# Patient Record
Sex: FEMALE | Race: WHITE | NOT HISPANIC OR LATINO | Employment: OTHER | URBAN - METROPOLITAN AREA
[De-identification: names, ages, dates, MRNs, and addresses within clinical notes are randomized per-mention and may not be internally consistent; named-entity substitution may affect disease eponyms.]

---

## 2017-03-13 ENCOUNTER — RECORDS - HEALTHEAST (OUTPATIENT)
Dept: LAB | Facility: CLINIC | Age: 72
End: 2017-03-13

## 2017-03-13 LAB
CHOLEST SERPL-MCNC: 223 MG/DL
FASTING STATUS PATIENT QL REPORTED: ABNORMAL
HDLC SERPL-MCNC: 39 MG/DL
LDLC SERPL CALC-MCNC: 147 MG/DL
TRIGL SERPL-MCNC: 183 MG/DL

## 2018-02-02 ENCOUNTER — DOCUMENTATION ONLY (OUTPATIENT)
Dept: FAMILY MEDICINE | Facility: OTHER | Age: 73
End: 2018-02-02

## 2018-02-02 RX ORDER — LOSARTAN POTASSIUM 50 MG/1
50 TABLET ORAL DAILY
COMMUNITY
Start: 2013-01-07

## 2018-02-02 RX ORDER — DULOXETIN HYDROCHLORIDE 30 MG/1
CAPSULE, DELAYED RELEASE ORAL
COMMUNITY
Start: 2009-10-07

## 2018-02-02 RX ORDER — SIMVASTATIN 20 MG
20 TABLET ORAL AT BEDTIME
COMMUNITY
Start: 2013-01-07

## 2018-02-02 RX ORDER — LEVOTHYROXINE SODIUM 25 UG/1
25 TABLET ORAL
COMMUNITY
Start: 2013-01-07

## 2018-02-02 RX ORDER — HYDROCHLOROTHIAZIDE 25 MG/1
25 TABLET ORAL DAILY
COMMUNITY
Start: 2013-01-07

## 2018-04-05 ENCOUNTER — RECORDS - HEALTHEAST (OUTPATIENT)
Dept: LAB | Facility: CLINIC | Age: 73
End: 2018-04-05

## 2018-04-05 LAB
ALBUMIN SERPL-MCNC: 3.5 G/DL (ref 3.5–5)
ALP SERPL-CCNC: 175 U/L (ref 45–120)
ALT SERPL W P-5'-P-CCNC: 22 U/L (ref 0–45)
ANION GAP SERPL CALCULATED.3IONS-SCNC: 10 MMOL/L (ref 5–18)
AST SERPL W P-5'-P-CCNC: 27 U/L (ref 0–40)
BILIRUB SERPL-MCNC: 0.5 MG/DL (ref 0–1)
BUN SERPL-MCNC: 14 MG/DL (ref 8–28)
CALCIUM SERPL-MCNC: 9 MG/DL (ref 8.5–10.5)
CHLORIDE BLD-SCNC: 103 MMOL/L (ref 98–107)
CHOLEST SERPL-MCNC: 220 MG/DL
CO2 SERPL-SCNC: 27 MMOL/L (ref 22–31)
CREAT SERPL-MCNC: 0.99 MG/DL (ref 0.6–1.1)
FASTING STATUS PATIENT QL REPORTED: ABNORMAL
GFR SERPL CREATININE-BSD FRML MDRD: 55 ML/MIN/1.73M2
GLUCOSE BLD-MCNC: 95 MG/DL (ref 70–125)
HDLC SERPL-MCNC: 31 MG/DL
LDLC SERPL CALC-MCNC: 134 MG/DL
POTASSIUM BLD-SCNC: 3.9 MMOL/L (ref 3.5–5)
PROT SERPL-MCNC: 7 G/DL (ref 6–8)
SODIUM SERPL-SCNC: 140 MMOL/L (ref 136–145)
TRIGL SERPL-MCNC: 273 MG/DL

## 2018-04-06 LAB — HCV AB SERPL QL IA: NEGATIVE

## 2018-07-06 ENCOUNTER — RECORDS - HEALTHEAST (OUTPATIENT)
Dept: ADMINISTRATIVE | Facility: OTHER | Age: 73
End: 2018-07-06

## 2018-07-06 ENCOUNTER — RECORDS - HEALTHEAST (OUTPATIENT)
Dept: LAB | Facility: CLINIC | Age: 73
End: 2018-07-06

## 2018-07-06 LAB
ALBUMIN SERPL-MCNC: 3.6 G/DL (ref 3.5–5)
ALP SERPL-CCNC: 124 U/L (ref 45–120)
ALT SERPL W P-5'-P-CCNC: <9 U/L (ref 0–45)
ANION GAP SERPL CALCULATED.3IONS-SCNC: 9 MMOL/L (ref 5–18)
AST SERPL W P-5'-P-CCNC: 14 U/L (ref 0–40)
BILIRUB SERPL-MCNC: 0.7 MG/DL (ref 0–1)
BUN SERPL-MCNC: 19 MG/DL (ref 8–28)
CALCIUM SERPL-MCNC: 9 MG/DL (ref 8.5–10.5)
CHLORIDE BLD-SCNC: 106 MMOL/L (ref 98–107)
CO2 SERPL-SCNC: 27 MMOL/L (ref 22–31)
CREAT SERPL-MCNC: 1.2 MG/DL (ref 0.6–1.1)
GFR SERPL CREATININE-BSD FRML MDRD: 44 ML/MIN/1.73M2
GLUCOSE BLD-MCNC: 97 MG/DL (ref 70–125)
POTASSIUM BLD-SCNC: 3.8 MMOL/L (ref 3.5–5)
PROT SERPL-MCNC: 7 G/DL (ref 6–8)
SODIUM SERPL-SCNC: 142 MMOL/L (ref 136–145)

## 2018-07-30 ENCOUNTER — HOSPITAL ENCOUNTER (OUTPATIENT)
Dept: NUCLEAR MEDICINE | Facility: HOSPITAL | Age: 73
Discharge: HOME | End: 2018-07-30
Attending: FAMILY MEDICINE

## 2018-07-30 ENCOUNTER — RECORDS - HEALTHEAST (OUTPATIENT)
Dept: ADMINISTRATIVE | Facility: OTHER | Age: 73
End: 2018-07-30

## 2018-07-30 ENCOUNTER — HOSPITAL ENCOUNTER (OUTPATIENT)
Dept: CARDIOLOGY | Facility: HOSPITAL | Age: 73
Discharge: HOME | End: 2018-07-30
Attending: FAMILY MEDICINE

## 2018-07-30 DIAGNOSIS — R07.9 CHEST PAIN, UNSPECIFIED TYPE: ICD-10-CM

## 2018-07-30 LAB
CV STRESS CURRENT BP HE: NORMAL
CV STRESS CURRENT HR HE: 78
CV STRESS CURRENT HR HE: 79
CV STRESS CURRENT HR HE: 80
CV STRESS CURRENT HR HE: 84
CV STRESS CURRENT HR HE: 84
CV STRESS CURRENT HR HE: 85
CV STRESS CURRENT HR HE: 86
CV STRESS CURRENT HR HE: 86
CV STRESS CURRENT HR HE: 87
CV STRESS CURRENT HR HE: 88
CV STRESS CURRENT HR HE: 89
CV STRESS DEVIATION TIME HE: NORMAL
CV STRESS ECHO PERCENT HR HE: NORMAL
CV STRESS EXERCISE STAGE HE: NORMAL
CV STRESS FINAL RESTING BP HE: NORMAL
CV STRESS FINAL RESTING HR HE: 84
CV STRESS MAX HR HE: 90
CV STRESS MAX TREADMILL GRADE HE: 0
CV STRESS MAX TREADMILL SPEED HE: 0
CV STRESS PEAK DIA BP HE: NORMAL
CV STRESS PEAK SYS BP HE: NORMAL
CV STRESS PHASE HE: NORMAL
CV STRESS PROTOCOL HE: NORMAL
CV STRESS RESTING PT POSITION HE: NORMAL
CV STRESS ST DEVIATION AMOUNT HE: NORMAL
CV STRESS ST DEVIATION ELEVATION HE: NORMAL
CV STRESS ST EVELATION AMOUNT HE: NORMAL
CV STRESS TEST TYPE HE: NORMAL
CV STRESS TOTAL STAGE TIME MIN 1 HE: NORMAL
NUC STRESS EJECTION FRACTION: 61 %
STRESS ECHO BASELINE BP: NORMAL
STRESS ECHO BASELINE HR: 78
STRESS ECHO CALCULATED PERCENT HR: 61 %
STRESS ECHO LAST STRESS BP: NORMAL
STRESS ECHO LAST STRESS HR: 87

## 2018-07-30 ASSESSMENT — MIFFLIN-ST. JEOR: SCORE: 1382.19

## 2018-08-08 ENCOUNTER — RECORDS - HEALTHEAST (OUTPATIENT)
Dept: LAB | Facility: CLINIC | Age: 73
End: 2018-08-08

## 2018-08-08 LAB
ALBUMIN SERPL-MCNC: 3.5 G/DL (ref 3.5–5)
ALP SERPL-CCNC: 114 U/L (ref 45–120)
ALT SERPL W P-5'-P-CCNC: 10 U/L (ref 0–45)
ANION GAP SERPL CALCULATED.3IONS-SCNC: 13 MMOL/L (ref 5–18)
AST SERPL W P-5'-P-CCNC: 13 U/L (ref 0–40)
BILIRUB SERPL-MCNC: 0.8 MG/DL (ref 0–1)
BUN SERPL-MCNC: 27 MG/DL (ref 8–28)
CALCIUM SERPL-MCNC: 9.3 MG/DL (ref 8.5–10.5)
CHLORIDE BLD-SCNC: 101 MMOL/L (ref 98–107)
CO2 SERPL-SCNC: 22 MMOL/L (ref 22–31)
CREAT SERPL-MCNC: 1.51 MG/DL (ref 0.6–1.1)
GFR SERPL CREATININE-BSD FRML MDRD: 34 ML/MIN/1.73M2
GLUCOSE BLD-MCNC: 118 MG/DL (ref 70–125)
POTASSIUM BLD-SCNC: 3.9 MMOL/L (ref 3.5–5)
PROT SERPL-MCNC: 7.2 G/DL (ref 6–8)
SODIUM SERPL-SCNC: 136 MMOL/L (ref 136–145)

## 2018-08-09 ENCOUNTER — RECORDS - HEALTHEAST (OUTPATIENT)
Dept: LAB | Facility: CLINIC | Age: 73
End: 2018-08-09

## 2018-08-09 LAB
C DIFF TOX B STL QL: NEGATIVE
RIBOTYPE 027/NAP1/BI: NORMAL

## 2018-08-10 LAB
O+P STL MICRO: NORMAL
SHIGA TOXIN 1: NEGATIVE
SHIGA TOXIN 2: NEGATIVE

## 2018-08-12 LAB — BACTERIA SPEC CULT: NORMAL

## 2018-08-31 ENCOUNTER — TRANSFERRED RECORDS (OUTPATIENT)
Dept: HEALTH INFORMATION MANAGEMENT | Facility: CLINIC | Age: 73
End: 2018-08-31

## 2018-08-31 ASSESSMENT — MIFFLIN-ST. JEOR
SCORE: 1341.36
SCORE: 1345.9

## 2018-09-02 ENCOUNTER — TRANSFERRED RECORDS (OUTPATIENT)
Dept: HEALTH INFORMATION MANAGEMENT | Facility: CLINIC | Age: 73
End: 2018-09-02

## 2018-09-03 ENCOUNTER — TRANSFERRED RECORDS (OUTPATIENT)
Dept: HEALTH INFORMATION MANAGEMENT | Facility: CLINIC | Age: 73
End: 2018-09-03

## 2018-09-03 ENCOUNTER — ANESTHESIA - HEALTHEAST (OUTPATIENT)
Dept: SURGERY | Facility: CLINIC | Age: 73
End: 2018-09-03

## 2018-09-03 ENCOUNTER — SURGERY - HEALTHEAST (OUTPATIENT)
Dept: SURGERY | Facility: CLINIC | Age: 73
End: 2018-09-03

## 2018-09-07 ENCOUNTER — COMMUNICATION - HEALTHEAST (OUTPATIENT)
Dept: PHARMACY | Facility: CLINIC | Age: 73
End: 2018-09-07

## 2018-09-10 ENCOUNTER — OFFICE VISIT - HEALTHEAST (OUTPATIENT)
Dept: GERIATRICS | Facility: CLINIC | Age: 73
End: 2018-09-10

## 2018-09-10 DIAGNOSIS — R19.7 DIARRHEA, UNSPECIFIED TYPE: ICD-10-CM

## 2018-09-10 DIAGNOSIS — R63.4 WEIGHT LOSS: ICD-10-CM

## 2018-09-10 DIAGNOSIS — K55.1 SMA STENOSIS: ICD-10-CM

## 2018-09-10 DIAGNOSIS — N39.0 URINARY TRACT INFECTION: ICD-10-CM

## 2018-09-10 DIAGNOSIS — N13.39 OTHER HYDRONEPHROSIS: ICD-10-CM

## 2018-09-10 DIAGNOSIS — I95.1 ORTHOSTATIC SYNCOPE: ICD-10-CM

## 2018-09-10 DIAGNOSIS — R29.6 FALLS FREQUENTLY: ICD-10-CM

## 2018-09-10 DIAGNOSIS — N85.8 UTERINE MASS: ICD-10-CM

## 2018-09-10 DIAGNOSIS — I77.4 CELIAC ARTERY STENOSIS (H): ICD-10-CM

## 2018-09-10 DIAGNOSIS — C53.9 CERVICAL CANCER (H): ICD-10-CM

## 2018-09-13 ENCOUNTER — OFFICE VISIT - HEALTHEAST (OUTPATIENT)
Dept: GERIATRICS | Facility: CLINIC | Age: 73
End: 2018-09-13

## 2018-09-13 DIAGNOSIS — R29.6 FALLS FREQUENTLY: ICD-10-CM

## 2018-09-13 DIAGNOSIS — C53.9 CERVICAL CANCER (H): ICD-10-CM

## 2018-09-13 DIAGNOSIS — I95.1 ORTHOSTATIC SYNCOPE: ICD-10-CM

## 2018-09-13 DIAGNOSIS — R53.81 PHYSICAL DECONDITIONING: ICD-10-CM

## 2018-09-17 ENCOUNTER — OFFICE VISIT - HEALTHEAST (OUTPATIENT)
Dept: GERIATRICS | Facility: CLINIC | Age: 73
End: 2018-09-17

## 2018-09-17 DIAGNOSIS — R19.7 DIARRHEA, UNSPECIFIED TYPE: ICD-10-CM

## 2018-09-17 DIAGNOSIS — I95.1 ORTHOSTATIC SYNCOPE: ICD-10-CM

## 2018-09-17 DIAGNOSIS — K55.1 SMA STENOSIS: ICD-10-CM

## 2018-09-17 DIAGNOSIS — C53.9 CERVICAL CANCER (H): ICD-10-CM

## 2018-09-17 DIAGNOSIS — R63.4 WEIGHT LOSS: ICD-10-CM

## 2018-09-17 DIAGNOSIS — I77.4 CELIAC ARTERY STENOSIS (H): ICD-10-CM

## 2018-09-17 DIAGNOSIS — N13.30 HYDRONEPHROSIS, UNSPECIFIED HYDRONEPHROSIS TYPE: ICD-10-CM

## 2018-09-17 DIAGNOSIS — N85.8 UTERINE MASS: ICD-10-CM

## 2018-09-18 ENCOUNTER — TRANSFERRED RECORDS (OUTPATIENT)
Dept: HEALTH INFORMATION MANAGEMENT | Facility: CLINIC | Age: 73
End: 2018-09-18

## 2018-09-21 ENCOUNTER — TRANSFERRED RECORDS (OUTPATIENT)
Dept: HEALTH INFORMATION MANAGEMENT | Facility: CLINIC | Age: 73
End: 2018-09-21

## 2018-09-24 ENCOUNTER — OFFICE VISIT - HEALTHEAST (OUTPATIENT)
Dept: GERIATRICS | Facility: CLINIC | Age: 73
End: 2018-09-24

## 2018-09-24 DIAGNOSIS — N13.30 HYDRONEPHROSIS, UNSPECIFIED HYDRONEPHROSIS TYPE: ICD-10-CM

## 2018-09-24 DIAGNOSIS — I95.1 ORTHOSTATIC SYNCOPE: ICD-10-CM

## 2018-09-24 DIAGNOSIS — N85.8 UTERINE MASS: ICD-10-CM

## 2018-09-24 DIAGNOSIS — I77.4 CELIAC ARTERY STENOSIS (H): ICD-10-CM

## 2018-09-24 DIAGNOSIS — R63.4 WEIGHT LOSS: ICD-10-CM

## 2018-09-24 DIAGNOSIS — C53.9 CERVICAL CANCER (H): ICD-10-CM

## 2018-09-24 DIAGNOSIS — K55.1 SMA STENOSIS: ICD-10-CM

## 2018-09-27 ENCOUNTER — OFFICE VISIT - HEALTHEAST (OUTPATIENT)
Dept: GERIATRICS | Facility: CLINIC | Age: 73
End: 2018-09-27

## 2018-09-27 DIAGNOSIS — R63.4 WEIGHT LOSS: ICD-10-CM

## 2018-09-27 DIAGNOSIS — K55.1 SMA STENOSIS: ICD-10-CM

## 2018-09-27 DIAGNOSIS — N85.8 UTERINE MASS: ICD-10-CM

## 2018-09-27 DIAGNOSIS — I77.4 CELIAC ARTERY STENOSIS (H): ICD-10-CM

## 2018-09-27 DIAGNOSIS — R41.89 COGNITIVE IMPAIRMENT: ICD-10-CM

## 2018-09-27 DIAGNOSIS — C53.9 CERVICAL CANCER (H): ICD-10-CM

## 2018-09-27 DIAGNOSIS — N13.39 OTHER HYDRONEPHROSIS: ICD-10-CM

## 2018-09-27 DIAGNOSIS — I95.1 ORTHOSTATIC SYNCOPE: ICD-10-CM

## 2018-09-30 ENCOUNTER — TRANSFERRED RECORDS (OUTPATIENT)
Dept: HEALTH INFORMATION MANAGEMENT | Facility: CLINIC | Age: 73
End: 2018-09-30

## 2018-10-01 ENCOUNTER — TRANSFERRED RECORDS (OUTPATIENT)
Dept: HEALTH INFORMATION MANAGEMENT | Facility: CLINIC | Age: 73
End: 2018-10-01

## 2018-10-01 ENCOUNTER — PRE VISIT (OUTPATIENT)
Dept: RADIATION ONCOLOGY | Facility: CLINIC | Age: 73
End: 2018-10-01

## 2018-10-01 NOTE — TELEPHONE ENCOUNTER
Date of appointment: TBD   Diagnosis/reason for appointment: Cervical Cancer  Referring provider/facility: Dr. Blount/[facility]  Who called: Emeli [phone]    Recent Studies  Imaging:  Pathology:  Labs:  Previous chemo/radiation (if known): Patient receiving External Beam starting 10/4/18    Records requested from: [facility] and [facility] Records received from:    Additional information: Being referred for Brachy

## 2018-10-02 ENCOUNTER — AMBULATORY - HEALTHEAST (OUTPATIENT)
Dept: GERIATRICS | Facility: CLINIC | Age: 73
End: 2018-10-02

## 2018-10-08 ENCOUNTER — TELEPHONE (OUTPATIENT)
Dept: RADIATION ONCOLOGY | Facility: CLINIC | Age: 73
End: 2018-10-08

## 2018-10-08 NOTE — TELEPHONE ENCOUNTER
Called Emeli RN with Dr. Blount, [facility] re: Nionska.  She is scheduled to start radiation therapy 108/18 and planned for 25 fractions/5000cGy.  Ninoska is being referred for possible interstitial therapy.  Per Emeli patient was hospitalized for bleeding, now discharged. She has dementia and is living with her daughter Dariela who is the person to contact for appointments.  Ninoska had a PET and MRI 9/21 at [facility]- note sent to  to have these pushed to our system.  Emeli's number [phone].

## 2018-10-09 PROCEDURE — 00000346 ZZHCL STATISTIC REVIEW OUTSIDE SLIDES TC 88321: Performed by: RADIOLOGY

## 2018-10-09 NOTE — TELEPHONE ENCOUNTER
Per pool message, scheduled her for a consult with Dr. Real on 10/17. Called [facility] to verify previous RT records will be sent.

## 2018-10-10 LAB — COPATH REPORT: NORMAL

## 2018-10-17 ENCOUNTER — OFFICE VISIT (OUTPATIENT)
Dept: RADIATION ONCOLOGY | Facility: CLINIC | Age: 73
End: 2018-10-17
Attending: RADIOLOGY
Payer: MEDICARE

## 2018-10-17 VITALS
BODY MASS INDEX: 23.07 KG/M2 | HEIGHT: 67 IN | SYSTOLIC BLOOD PRESSURE: 147 MMHG | WEIGHT: 147 LBS | DIASTOLIC BLOOD PRESSURE: 70 MMHG

## 2018-10-17 DIAGNOSIS — C53.1 MALIGNANT NEOPLASM OF EXOCERVIX (H): Primary | ICD-10-CM

## 2018-10-17 PROCEDURE — G0463 HOSPITAL OUTPT CLINIC VISIT: HCPCS | Performed by: RADIOLOGY

## 2018-10-17 PROCEDURE — A4648 IMPLANTABLE TISSUE MARKER: HCPCS | Performed by: RADIOLOGY

## 2018-10-17 RX ORDER — OXYCODONE AND ACETAMINOPHEN 5; 325 MG/1; MG/1
TABLET ORAL
COMMUNITY
Start: 2018-10-05

## 2018-10-17 RX ORDER — ESCITALOPRAM OXALATE 20 MG/1
TABLET ORAL
COMMUNITY
Start: 2018-04-17

## 2018-10-17 RX ORDER — LEVOFLOXACIN 250 MG/1
TABLET, FILM COATED ORAL
COMMUNITY
Start: 2018-10-05

## 2018-10-17 RX ORDER — ONDANSETRON 8 MG/1
TABLET, FILM COATED ORAL
COMMUNITY
Start: 2018-10-15

## 2018-10-17 RX ORDER — LISINOPRIL 5 MG/1
TABLET ORAL
COMMUNITY
Start: 2018-04-17

## 2018-10-17 ASSESSMENT — ENCOUNTER SYMPTOMS
CONSTIPATION: 0
FEVER: 0
DIZZINESS: 0
WEIGHT LOSS: 0
SPEECH CHANGE: 0
CLAUDICATION: 0
PHOTOPHOBIA: 0
BLOOD IN STOOL: 0
SORE THROAT: 0
ORTHOPNEA: 0
HEMOPTYSIS: 0
TINGLING: 0
INSOMNIA: 0
PALPITATIONS: 0
WEAKNESS: 0
STRIDOR: 0
BRUISES/BLEEDS EASILY: 0
DYSURIA: 0
DEPRESSION: 0
DIAPHORESIS: 0
HEADACHES: 0
MEMORY LOSS: 0
NAUSEA: 0
DIARRHEA: 0
EYE DISCHARGE: 0
DOUBLE VISION: 0
VOMITING: 0
EYE PAIN: 0
SENSORY CHANGE: 0
TREMORS: 0
FALLS: 0
POLYDIPSIA: 0
FLANK PAIN: 0
SPUTUM PRODUCTION: 0
SHORTNESS OF BREATH: 0
EYE REDNESS: 0
COUGH: 0
SINUS PAIN: 0
HALLUCINATIONS: 0
FOCAL WEAKNESS: 0
BLURRED VISION: 0
BACK PAIN: 1
SEIZURES: 0
CHILLS: 0
NERVOUS/ANXIOUS: 1
MYALGIAS: 0
PND: 0
HEARTBURN: 0
NECK PAIN: 0
LOSS OF CONSCIOUSNESS: 0
HEMATURIA: 0
ABDOMINAL PAIN: 1
WHEEZING: 0
FREQUENCY: 0

## 2018-10-17 ASSESSMENT — LIFESTYLE VARIABLES: SUBSTANCE_ABUSE: 0

## 2018-10-17 NOTE — PROGRESS NOTES
RADIATION ONCOLOGY CONSULTATION    DATE:  October 17, 2018  PATIENT NAME: Ninoska Still  MEDICAL RECORD NUMBER: [id number]    Ninoska Still is seen in consultation at the request of Dr. 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    HISTORY OF PRESENT ILLNESS:   Ms. Ninoska Still is a 73 year-old woman who was hospitalized in early September 2018 for urosepsis and found to have an enlarged cervical mass on CT scan of the abdomen and pelvis. 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 Pelvic ultrasound demonstrated a mass arising from the cervix, measuring 7.8 cm in greatest dimension, with involvement of the lower uterine segment.  She underwent left ureteral stenting. At time of ureteral stenting, the bladder trigone was distorted and abnormal tissue was visualized below the mucosa at the level of the left ureteral orifice.  Biopsy demonstrated moderately differentiated squamous cell carcinoma.  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  CT angiogram showed collateral arterial flow to the mesentery via the inferior mesenteric artery without concern for bowel ischemia.  She was seen by Dr. Lane Blount on 9/18/18 at [facility].  On exam, a large, friable, minimally mobile mass at the left vaginal apex was noted with replacement of the cervix os. Further staging evaluation with a PET CT scan and pelvic MRI were recommended prior to starting definitive chemoradiation.  PET/CT scan on 9/21/2018 showed a 10.6 cm lobulated mass in the lower uterus, cervix and upper vagina (SUV max 10.9), a 4.7 cm right thyroid lobe nodule (SUV max 8.3) and 3 FDG avid left parotid nodules, largest measuring 0.9 cm.  MRI of the pelvis on  the same day showed an 11 cm cervical mass with invasion of the parametria.  There was also posterior bladder wall invasion with involvement of the left ureterovesical junction and upper vaginal invasion.  On MRI scan, there were mildly prominent right external and internal iliac nodes although no adenopathy was seen on PET scan.  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  She has started chemoradiation with weekly cisplatin chemotherapy.  She presents for consideration of high-dose rate brachytherapy to the cervix and vagina.  Ms. Still has dementia and was recently unable to care for herself.  She has moved in with her daughter for the duration of her treatments.  She is currently tolerating chemoradiation without significant side effects.  She denies any further vaginal bleeding, urinary frequency, dysuria, hematuria.  PAST MEDICAL HISTORY:   Diagnosis Date     Cervical cancer (H)      Hyperlipidemia      Hypertension      PAST SURGICAL HISTORY:  Procedure Laterality Date     CHOLECYSTECTOMY       PAST RADIATION THERAPY HISTORY: Currently receiving pelvic external beam radiation at [facility]      PAST CHEMOTHERAPY HISTORY: Currently receiving weekly low-dose cisplatin at [facility]      ELECTRONIC CARDIAC DEVICE: None      PREGNANCY STATUS: Postmenopausal    MEDICATIONS:  Medication Sig     Duloxetine (CYMBALTA) 30 MG EC capsule      escitalopram (LEXAPRO) 20 MG tablet      hydrochlorothiazide (HYDRODIURIL) 25 MG tablet Take 25 mg by mouth daily     levofloxacin (LEVAQUIN) 250 MG tablet      levothyroxine (SYNTHROID/LEVOTHROID) 25 MCG tablet Take 25 mcg by mouth every morning (before breakfast)     lisinopril (PRINIVIL/ZESTRIL) 5 MG tablet      losartan (COZAAR) 50 MG tablet Take 50 mg by mouth daily     ondansetron (ZOFRAN) 8 MG  "tablet      oxyCODONE-acetaminophen (PERCOCET) 5-325 MG per tablet      simvastatin (ZOCOR) 20 MG tablet Take 20 mg by mouth At Bedtime     ALLERGY:  Allergen Reactions     Codeine Visual Disturbance     Penicillins      FAMILY CANCER HISTORY: Noncontributory    SOCIAL HISTORY:  Social History   Substance Use Topics     Smoking status: Former Smoker     Quit date: 10/17/2017     Smokeless tobacco: Never Used     Alcohol use No     REVIEW OF SYSTEMS: A 14 point review of systems was performed and reviewed in the medical record.  Pertinent positives and negatives are noted in the history of present illness.    KARNOFSKY PERFORMANCE STATUS: 50    PHYSICAL EXAMINATION:  /70  Ht 1.702 m (5' 7\")  Wt 66.7 kg (147 lb)  BMI 23.02 kg/m2, pain 0/10  GENERAL: Alert, oriented, not in acute distress.   HEENT: Normocephalic, atraumatic  LUNGS: No wheezing, clear to auscultation bilaterally  CARDIOVASCULAR: Regular rate and rhythm  ABDOMEN: Soft, nondistended, nontender  GENITOURINARY: External genitalia without lesions.  Speculum exam demonstrates no blood in the vaginal vault.  There is a large, friable mass in the superior vagina without visualization of the cervical os.  Tumor extends down the anterior vaginal wall.  On bimanual exam, there is palpable tumor to within 2 cm of the introitus anteriorly extending from 10:00 to 3:00.  The rectovaginal septum is clear of tumor.  EXTREMITIES: No edema  LYMPH: No palpable supraclavicular, axillary, periumbilical or inguinal adenopathy    2 gold fiducial markers were placed at the time of pelvic exam in the anterior vaginal wall to represent the most inferior extent of tumor.    IMAGING: See history of present illness    ASSESSMENT AND PLAN: Ms. Ninoska Still is 73 year old female with locally advanced squamous cell carcinoma cervical cancer currently undergoing concurrent chemoradiation at [facility]. She presents today with her daughter for discussion of high-dose rate " brachytherapy as part of her treatment.  We explained the rationale for recommending high-dose rate brachytherapy following the completion of pelvic chemoradiation.  Brachytherapy will allow her primary tumor to receive enough radiation dose to achieve local tumor control.  Because her cancer extends to the mid vagina anteriorly, brachytherapy is best delivered with interstitial treatment to adequately dose the vaginal and bladder tissues in addition to the primary cervical mass.  Interstitial brachytherapy is typically delivered as an inpatient over 3 days.  While in the hospital, the patient is supine and has an epidural for pain control.  We attempted to deliver the entire scope of her therapy within 56 days so that her brachytherapy will be scheduled to occur at the conclusion of her chemoradiation.  We will see her back after her last fashion of external beam radiation and obtain a repeat MRI scan of the pelvis to define the scope of disease and her treatment response.  She will also see preanesthesia for evaluation of suitability for an epidural catheter.  Typically placement of the interstitial template is scheduled within 1 to 1-1/2 weeks of completion of chemoradiation.  We also reviewed the anticipated acute side effects, potential risks and expected outcome of treatment.  Because her tumor involves the ureterovesical junction, she and her daughter were counseled on the risk of a vesicovaginal fistula formation.     Ms. Still was seen and discussed with staff, Dr. Real. Thank you for involving us in the care of this patient.  Please feel free to contact us with questions or concerns at any time.    Jacob Jarquin MD  PGY-2 Resident, Radiation Oncology  [facility]    Ms. Still was seen and examined by me. Note above by Dr. Jarquin was reviewed and edited by me and reflects our mutual findings and plan of care.  I spent a total of 60 minutes face-to-face with  and  her daughter during todays office visit. Over 75% of this time was spent counseling the patient and/or coordinating care regarding the diagnosis and the planned treatment.    Enedina Real MD  Department of Radiation Oncology  [facility]    Lane Astorga MD, Amy MD

## 2018-10-17 NOTE — PROGRESS NOTES
HPI  INITIAL PATIENT ASSESSMENT    Diagnosis: Cervical Cancer    Prior radiation therapy: Yes, currently    Prior chemotherapy: Yes, currently    Prior hormonal therapy:No    Pain Eval:  Current history of pain associated with this visit:   Intensity: 10/10  Current: aching  Location: Abdomen  Treatment: Percocet and Tylenol, patient just took medication in clinic    Psychosocial  Living arrangements: Lives with daughter  Fall Risk: independent   referral needs: Not needed    Advanced Directive: No  Implantable Cardiac Device? No    Onset of menarche: 13  LMP: No LMP recorded.  Onset of menopause: 50  Abnormal vaginal bleeding/discharge: No  Nurse face-to-face time: Level 5:  over 15 min face to face time  Review of Systems   Constitutional: Positive for malaise/fatigue. Negative for chills, diaphoresis, fever and weight loss.   HENT: Negative for congestion, ear discharge, ear pain, hearing loss, nosebleeds, sinus pain, sore throat and tinnitus.    Eyes: Negative for blurred vision, double vision, photophobia, pain, discharge and redness.   Respiratory: Negative for cough, hemoptysis, sputum production, shortness of breath, wheezing and stridor.    Cardiovascular: Negative for chest pain, palpitations, orthopnea, claudication, leg swelling and PND.   Gastrointestinal: Positive for abdominal pain. Negative for blood in stool, constipation, diarrhea, heartburn, melena, nausea and vomiting.   Genitourinary: Negative for dysuria, flank pain, frequency, hematuria and urgency.   Musculoskeletal: Positive for back pain. Negative for falls, joint pain, myalgias and neck pain.   Skin: Negative for itching and rash.   Neurological: Negative for dizziness, tingling, tremors, sensory change, speech change, focal weakness, seizures, loss of consciousness, weakness and headaches.   Endo/Heme/Allergies: Negative for environmental allergies and polydipsia. Does not bruise/bleed easily.   Psychiatric/Behavioral:  Negative for depression, hallucinations, memory loss, substance abuse and suicidal ideas. The patient is nervous/anxious. The patient does not have insomnia.

## 2018-10-17 NOTE — MR AVS SNAPSHOT
"              After Visit Summary   10/17/2018    Ninoska Still    MRN: [id number]           Patient Information     Date Of Birth          1945        Visit Information        Provider Department      10/17/2018 9:00 AM Enedina Real MD Radiation Oncology Clinic        Today's Diagnoses     Malignant neoplasm of exocervix (H)    -  1       Follow-ups after your visit        Who to contact     Please call your clinic at [phone] to:    Ask questions about your health    Make or cancel appointments    Discuss your medicines    Learn about your test results    Speak to your doctor            Additional Information About Your Visit        MyChart Information     Smart Cube is an electronic gateway that provides easy, online access to your medical records. With Smart Cube, you can request a clinic appointment, read your test results, renew a prescription or communicate with your care team.     To sign up for Smart Cube visit the website at [email or web address]   You will be asked to enter the access code listed below, as well as some personal information. Please follow the directions to create your username and password.     Your access code is: [email or web address]  Expires: 2019  7:57 PM     Your access code will  in 90 days. If you need help or a new code, please contact your [facility] or call [phone] for assistance.        Care EveryWhere ID     This is your Care EveryWhere ID. This could be used by other organizations to access your [place] medical records  [id number]        Your Vitals Were     Height BMI (Body Mass Index)                1.702 m (5' 7\") 23.02 kg/m2           Blood Pressure from Last 3 Encounters:   10/17/18 147/70    Weight from Last 3 Encounters:   10/17/18 66.7 kg (147 lb)              Today, you had the following     No orders found for display      Information about OPIOIDS     PRESCRIPTION OPIOIDS: WHAT YOU NEED TO KNOW   We gave you " an opioid (narcotic) pain medicine. It is important to manage your pain, but opioids are not always the best choice. You should first try all the other options your care team gave you. Take this medicine for as short a time (and as few doses) as possible.    Some activities can increase your pain, such as bandage changes or therapy sessions. It may help to take your pain medicine 30 to 60 minutes before these activities. Reduce your stress by getting enough sleep, working on hobbies you enjoy and practicing relaxation or meditation. Talk to your care team about ways to manage your pain beyond prescription opioids.    These medicines have risks:    DO NOT drive when on new or higher doses of pain medicine. These medicines can affect your alertness and reaction times, and you could be arrested for driving under the influence (DUI). If you need to use opioids long-term, talk to your care team about driving.    DO NOT operate heavy machinery    DO NOT do any other dangerous activities while taking these medicines.    DO NOT drink any alcohol while taking these medicines.     If the opioid prescribed includes acetaminophen, DO NOT take with any other medicines that contain acetaminophen. Read all labels carefully. Look for the word  acetaminophen  or  Tylenol.  Ask your pharmacist if you have questions or are unsure.    You can get addicted to pain medicines, especially if you have a history of addiction (chemical, alcohol or substance dependence). Talk to your care team about ways to reduce this risk.    All opioids tend to cause constipation. Drink plenty of water and eat foods that have a lot of fiber, such as fruits, vegetables, prune juice, apple juice and high-fiber cereal. Take a laxative (Miralax, milk of magnesia, Colace, Senna) if you don t move your bowels at least every other day. Other side effects include upset stomach, sleepiness, dizziness, throwing up, tolerance (needing more of the medicine to have the  same effect), physical dependence and slowed breathing.    Store your pills in a secure place, locked if possible. We will not replace any lost or stolen medicine. If you don t finish your medicine, please throw away (dispose) as directed by your pharmacist. The [place] [employer] has more information about safe disposal: https://www.pca.[facility].[place].us/living-green/managing-unwanted-medications         Primary Care Provider    None Specified       No primary provider on file.        Equal Access to Services     TREASURE MELGAR : Hadii minna ku hadasho Soomaali, waaxda luqadaha, qaybta kaalmada adeegyada, loretta waldron . So [facility] [phone].    ATENCIÓN: Si juanita rincon, tiene a connolly disposición servicios gratuitos de asistencia lingüística. Llame al [phone].    We comply with applicable federal civil rights laws and [place] laws. We do not discriminate on the basis of race, color, national origin, age, disability, sex, sexual orientation, or gender identity.            Thank you!     Thank you for choosing [facility]  for your care. Our goal is always to provide you with excellent care. Hearing back from our patients is one way we can continue to improve our services. Please take a few minutes to complete the written survey that you may receive in the mail after your visit with us. Thank you!             Your Updated Medication List - Protect others around you: Learn how to safely use, store and throw away your medicines at www.disposemymeds.org.          This list is accurate as of 10/17/18 11:59 PM.  Always use your most recent med list.                   Brand Name Dispense Instructions for use Diagnosis    CYMBALTA 30 MG EC capsule   Generic drug:  DULoxetine           escitalopram 20 MG tablet    LEXAPRO          hydrochlorothiazide 25 MG tablet    HYDRODIURIL     Take 25 mg by mouth daily        levofloxacin 250 MG tablet    LEVAQUIN           levothyroxine 25 MCG tablet    SYNTHROID/LEVOTHROID     Take 25 mcg by mouth every morning (before breakfast)        lisinopril 5 MG tablet    PRINIVIL/ZESTRIL          losartan 50 MG tablet    COZAAR     Take 50 mg by mouth daily        ondansetron 8 MG tablet    ZOFRAN          oxyCODONE-acetaminophen 5-325 MG per tablet    PERCOCET          simvastatin 20 MG tablet    ZOCOR     Take 20 mg by mouth At Bedtime

## 2018-10-17 NOTE — LETTER
Date:October 22, 2018      Patient was self referred, no letter generated. Do not send.        [facility] Information

## 2018-10-17 NOTE — LETTER
10/17/2018       RE: Ninoska Still  [address] [facility] [place] [address]     Dear Colleague,    Thank you for referring your patient, Ninoska Still, to the RADIATION ONCOLOGY CLINIC. Please see a copy of my visit note below.      HPI  INITIAL PATIENT ASSESSMENT    Diagnosis: Cervical Cancer    Prior radiation therapy: Yes, currently    Prior chemotherapy: Yes, currently    Prior hormonal therapy:No    Pain Eval:  Current history of pain associated with this visit:   Intensity: 10/10  Current: aching  Location: Abdomen  Treatment: Percocet and Tylenol, patient just took medication in clinic    Psychosocial  Living arrangements: Lives with daughter  Fall Risk: independent   referral needs: Not needed    Advanced Directive: No  Implantable Cardiac Device? No    Onset of menarche: 13  LMP: No LMP recorded.  Onset of menopause: 50  Abnormal vaginal bleeding/discharge: No  Nurse face-to-face time: Level 5:  over 15 min face to face time  Review of Systems   Constitutional: Positive for malaise/fatigue. Negative for chills, diaphoresis, fever and weight loss.   HENT: Negative for congestion, ear discharge, ear pain, hearing loss, nosebleeds, sinus pain, sore throat and tinnitus.    Eyes: Negative for blurred vision, double vision, photophobia, pain, discharge and redness.   Respiratory: Negative for cough, hemoptysis, sputum production, shortness of breath, wheezing and stridor.    Cardiovascular: Negative for chest pain, palpitations, orthopnea, claudication, leg swelling and PND.   Gastrointestinal: Positive for abdominal pain. Negative for blood in stool, constipation, diarrhea, heartburn, melena, nausea and vomiting.   Genitourinary: Negative for dysuria, flank pain, frequency, hematuria and urgency.   Musculoskeletal: Positive for back pain. Negative for falls, joint pain, myalgias and neck pain.   Skin: Negative for itching and rash.   Neurological: Negative for dizziness, tingling,  tremors, sensory change, speech change, focal weakness, seizures, loss of consciousness, weakness and headaches.   Endo/Heme/Allergies: Negative for environmental allergies and polydipsia. Does not bruise/bleed easily.   Psychiatric/Behavioral: Negative for depression, hallucinations, memory loss, substance abuse and suicidal ideas. The patient is nervous/anxious. The patient does not have insomnia.                  RADIATION ONCOLOGY CONSULTATION    DATE:  October 17, 2018  PATIENT NAME: Ninoska Still  MEDICAL RECORD NUMBER: [id number]    Ninoska Still is seen in consultation at the request of Dr. 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    HISTORY OF PRESENT ILLNESS:   Ms. Ninoska Still is a 73 year-old woman who was hospitalized in early September 2018 for urosepsis and found to have an enlarged cervical mass on CT scan of the abdomen and pelvis. 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 Pelvic ultrasound demonstrated a mass arising from the cervix, measuring 7.8 cm in greatest dimension, with involvement of the lower uterine segment.  She underwent left ureteral stenting. At time of ureteral stenting, the bladder trigone was distorted and abnormal tissue was visualized below the mucosa at the level of the left ureteral orifice.  Biopsy demonstrated moderately differentiated squamous cell carcinoma.  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  CT angiogram showed collateral arterial flow to the mesentery via the inferior mesenteric artery without concern for bowel ischemia.  She was seen by Dr. Lane Blount on 9/18/18 at [facility].  On exam, a large, friable, minimally mobile mass at the left vaginal apex  was noted with replacement of the cervix os. Further staging evaluation with a PET CT scan and pelvic MRI were recommended prior to starting definitive chemoradiation.  PET/CT scan on 9/21/2018 showed a 10.6 cm lobulated mass in the lower uterus, cervix and upper vagina (SUV max 10.9), a 4.7 cm right thyroid lobe nodule (SUV max 8.3) and 3 FDG avid left parotid nodules, largest measuring 0.9 cm.  MRI of the pelvis on the same day showed an 11 cm cervical mass with invasion of the parametria.  There was also posterior bladder wall invasion with involvement of the left ureterovesical junction and upper vaginal invasion.  On MRI scan, there were mildly prominent right external and internal iliac nodes although no adenopathy was seen on PET scan.  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  She has started chemoradiation with weekly cisplatin chemotherapy.  She presents for consideration of high-dose rate brachytherapy to the cervix and vagina.  Ms. Still has dementia and was recently unable to care for herself.  She has moved in with her daughter for the duration of her treatments.  She is currently tolerating chemoradiation without significant side effects.  She denies any further vaginal bleeding, urinary frequency, dysuria, hematuria.  PAST MEDICAL HISTORY:   Diagnosis Date     Cervical cancer (H)      Hyperlipidemia      Hypertension      PAST SURGICAL HISTORY:  Procedure Laterality Date     CHOLECYSTECTOMY       PAST RADIATION THERAPY HISTORY: Currently receiving pelvic external beam radiation at [facility]      PAST CHEMOTHERAPY HISTORY: Currently receiving weekly low-dose cisplatin at [facility]      ELECTRONIC CARDIAC DEVICE: None      PREGNANCY STATUS: Postmenopausal    MEDICATIONS:  Medication Sig     Duloxetine (CYMBALTA) 30 MG  "EC capsule      escitalopram (LEXAPRO) 20 MG tablet      hydrochlorothiazide (HYDRODIURIL) 25 MG tablet Take 25 mg by mouth daily     levofloxacin (LEVAQUIN) 250 MG tablet      levothyroxine (SYNTHROID/LEVOTHROID) 25 MCG tablet Take 25 mcg by mouth every morning (before breakfast)     lisinopril (PRINIVIL/ZESTRIL) 5 MG tablet      losartan (COZAAR) 50 MG tablet Take 50 mg by mouth daily     ondansetron (ZOFRAN) 8 MG tablet      oxyCODONE-acetaminophen (PERCOCET) 5-325 MG per tablet      simvastatin (ZOCOR) 20 MG tablet Take 20 mg by mouth At Bedtime     ALLERGY:  Allergen Reactions     Codeine Visual Disturbance     Penicillins      FAMILY CANCER HISTORY: Noncontributory    SOCIAL HISTORY:  Social History   Substance Use Topics     Smoking status: Former Smoker     Quit date: 10/17/2017     Smokeless tobacco: Never Used     Alcohol use No     REVIEW OF SYSTEMS: A 14 point review of systems was performed and reviewed in the medical record.  Pertinent positives and negatives are noted in the history of present illness.    KARNOFSKY PERFORMANCE STATUS: 50    PHYSICAL EXAMINATION:  /70  Ht 1.702 m (5' 7\")  Wt 66.7 kg (147 lb)  BMI 23.02 kg/m2, pain 0/10  GENERAL: Alert, oriented, not in acute distress.   HEENT: Normocephalic, atraumatic  LUNGS: No wheezing, clear to auscultation bilaterally  CARDIOVASCULAR: Regular rate and rhythm  ABDOMEN: Soft, nondistended, nontender  GENITOURINARY: External genitalia without lesions.  Speculum exam demonstrates no blood in the vaginal vault.  There is a large, friable mass in the superior vagina without visualization of the cervical os.  Tumor extends down the anterior vaginal wall.  On bimanual exam, there is palpable tumor to within 2 cm of the introitus anteriorly extending from 10:00 to 3:00.  The rectovaginal septum is clear of tumor.  EXTREMITIES: No edema  LYMPH: No palpable supraclavicular, axillary, periumbilical or inguinal adenopathy    2 gold fiducial markers " were placed at the time of pelvic exam in the anterior vaginal wall to represent the most inferior extent of tumor.    IMAGING: See history of present illness    ASSESSMENT AND PLAN: Ms. Ninoska Still is 73 year old female with locally advanced squamous cell carcinoma cervical cancer currently undergoing concurrent chemoradiation at [facility]. She presents today with her daughter for discussion of high-dose rate brachytherapy as part of her treatment.  We explained the rationale for recommending high-dose rate brachytherapy following the completion of pelvic chemoradiation.  Brachytherapy will allow her primary tumor to receive enough radiation dose to achieve local tumor control.  Because her cancer extends to the mid vagina anteriorly, brachytherapy is best delivered with interstitial treatment to adequately dose the vaginal and bladder tissues in addition to the primary cervical mass.  Interstitial brachytherapy is typically delivered as an inpatient over 3 days.  While in the hospital, the patient is supine and has an epidural for pain control.  We attempted to deliver the entire scope of her therapy within 56 days so that her brachytherapy will be scheduled to occur at the conclusion of her chemoradiation.  We will see her back after her last fashion of external beam radiation and obtain a repeat MRI scan of the pelvis to define the scope of disease and her treatment response.  She will also see preanesthesia for evaluation of suitability for an epidural catheter.  Typically placement of the interstitial template is scheduled within 1 to 1-1/2 weeks of completion of chemoradiation.  We also reviewed the anticipated acute side effects, potential risks and expected outcome of treatment.  Because her tumor involves the ureterovesical junction, she and her daughter were counseled on the risk of a vesicovaginal fistula formation.     Ms. Still was seen and discussed with staff, Dr. Real. Thank you for  involving us in the care of this patient.  Please feel free to contact us with questions or concerns at any time.    Jacob Jarquin MD  PGY-2 Resident, Radiation Oncology  [facility]    Ms. Still was seen and examined by me. Note above by Dr. Jarquin was reviewed and edited by me and reflects our mutual findings and plan of care.  I spent a total of 60 minutes face-to-face with  and her daughter during todays office visit. Over 75% of this time was spent counseling the patient and/or coordinating care regarding the diagnosis and the planned treatment.    Enedina Real MD  Department of Radiation Oncology  [facility]    Lane Astorga MD, Amy MD           Again, thank you for allowing me to participate in the care of your patient.      Sincerely,    Enedina Real MD

## 2018-10-21 DIAGNOSIS — C53.1 MALIGNANT NEOPLASM OF EXOCERVIX (H): Primary | ICD-10-CM

## 2018-10-21 RX ORDER — CLINDAMYCIN PHOSPHATE 900 MG/50ML
900 INJECTION, SOLUTION INTRAVENOUS SEE ADMIN INSTRUCTIONS
Status: CANCELLED | OUTPATIENT
Start: 2018-10-21

## 2018-10-21 RX ORDER — CLINDAMYCIN PHOSPHATE 900 MG/50ML
900 INJECTION, SOLUTION INTRAVENOUS
Status: CANCELLED | OUTPATIENT
Start: 2018-10-21

## 2018-10-23 ENCOUNTER — HOSPITAL ENCOUNTER (INPATIENT)
Facility: CLINIC | Age: 73
Setting detail: SURGERY ADMIT
End: 2018-10-23
Attending: RADIOLOGY | Admitting: RADIOLOGY
Payer: MEDICARE

## 2018-10-23 ENCOUNTER — TELEPHONE (OUTPATIENT)
Dept: RADIATION ONCOLOGY | Facility: CLINIC | Age: 73
End: 2018-10-23

## 2018-10-23 NOTE — TELEPHONE ENCOUNTER
Called patient to review brachytherapy schedule and she was an inpatient at [facility]. Discussed schedule briefly and told her I would send her schedule in the mail with contact information should she have any further questions.

## 2018-11-02 ENCOUNTER — TELEPHONE (OUTPATIENT)
Dept: RADIATION ONCOLOGY | Facility: CLINIC | Age: 73
End: 2018-11-02

## 2018-11-02 NOTE — TELEPHONE ENCOUNTER
Patient's daughter called and her mother would like to cancel all of her appointments for brachytherapy. According to daughter patient has been an inpatient for an additional 2 weeks and just doesn't want to continue with any course of treatment.

## 2018-12-07 ENCOUNTER — RECORDS - HEALTHEAST (OUTPATIENT)
Dept: LAB | Facility: CLINIC | Age: 73
End: 2018-12-07

## 2018-12-10 LAB
CREAT SERPL-MCNC: 0.65 MG/DL (ref 0.6–1.1)
GFR SERPL CREATININE-BSD FRML MDRD: >60 ML/MIN/1.73M2

## 2019-01-14 ENCOUNTER — RECORDS - HEALTHEAST (OUTPATIENT)
Dept: LAB | Facility: CLINIC | Age: 74
End: 2019-01-14

## 2019-01-15 LAB — POTASSIUM BLD-SCNC: 3.9 MMOL/L (ref 3.5–5)

## 2019-04-17 ENCOUNTER — RECORDS - HEALTHEAST (OUTPATIENT)
Dept: LAB | Facility: CLINIC | Age: 74
End: 2019-04-17

## 2019-04-17 LAB
ALBUMIN UR-MCNC: ABNORMAL MG/DL
APPEARANCE UR: CLEAR
BACTERIA #/AREA URNS HPF: ABNORMAL HPF
BILIRUB UR QL STRIP: NEGATIVE
COLOR UR AUTO: YELLOW
GLUCOSE UR STRIP-MCNC: NEGATIVE MG/DL
HGB UR QL STRIP: ABNORMAL
KETONES UR STRIP-MCNC: NEGATIVE MG/DL
LEUKOCYTE ESTERASE UR QL STRIP: ABNORMAL
MUCOUS THREADS #/AREA URNS LPF: ABNORMAL LPF
NITRATE UR QL: NEGATIVE
PH UR STRIP: 5.5 [PH] (ref 4.5–8)
RBC #/AREA URNS AUTO: >100 HPF
SP GR UR STRIP: 1.01 (ref 1–1.03)
SQUAMOUS #/AREA URNS AUTO: ABNORMAL LPF
UROBILINOGEN UR STRIP-ACNC: ABNORMAL
WBC #/AREA URNS AUTO: ABNORMAL HPF

## 2019-04-20 LAB — BACTERIA SPEC CULT: ABNORMAL

## 2019-07-23 ENCOUNTER — RECORDS - HEALTHEAST (OUTPATIENT)
Dept: LAB | Facility: CLINIC | Age: 74
End: 2019-07-23

## 2019-07-23 LAB
CREAT SERPL-MCNC: 1.04 MG/DL (ref 0.6–1.1)
GFR SERPL CREATININE-BSD FRML MDRD: 52 ML/MIN/1.73M2
POTASSIUM BLD-SCNC: 3.5 MMOL/L (ref 3.5–5)

## 2021-06-01 VITALS — WEIGHT: 186 LBS | BODY MASS INDEX: 28.19 KG/M2 | HEIGHT: 68 IN

## 2021-06-02 VITALS — WEIGHT: 177.2 LBS | BODY MASS INDEX: 25.43 KG/M2

## 2021-06-02 VITALS — BODY MASS INDEX: 24.34 KG/M2 | HEIGHT: 70 IN | WEIGHT: 170 LBS

## 2021-06-20 NOTE — ANESTHESIA CARE TRANSFER NOTE
Last vitals:   Vitals:    09/03/18 0826   BP: 105/53   Pulse: 73   Resp: 16   Temp:    SpO2: 94%     Patient's level of consciousness is drowsy  Spontaneous respirations: yes  Maintains airway independently: yes  Dentition unchanged: yes  Oropharynx: oropharynx clear of all foreign objects    QCDR Measures:  ASA# 20 - Surgical Safety Checklist: WHO surgical safety checklist completed prior to induction  PQRS# 430 - Adult PONV Prevention: NA - Not adult patient, not GA or 3 or more risk factors NOT present  ASA# 8 - Peds PONV Prevention: NA - Not pediatric patient, not GA or 2 or more risk factors NOT present  PQRS# 424 - Giulia-op Temp Management: NA - MAC anesthesia or case < 60 minutes  PQRS# 426 - PACU Transfer Protocol:NA - Patient did not go to PACU  ASA# 14 - Acute Post-op Pain: NA - Patient under age 10y or did not go to PACU

## 2021-06-20 NOTE — ANESTHESIA POSTPROCEDURE EVALUATION
Patient: Ninoska Still  CYSTOSCOPY, WITH left retrograde pyelogram, left URETERAL STENT INSERTION , BIOPSY, CERVIX, PUNCH - large vaginal mass.  Unclear if arising from the cervix or endometrium.  Friable mass of tissue dislodged with difital exam - this is sent for path  Anesthesia type: general    Patient location: floor  Last vitals:   Vitals:    09/03/18 0915   BP: 126/60   Pulse: 65   Resp: 18   Temp: 36.7  C (98  F)   SpO2: 100%     Post vital signs: stable  Level of consciousness: awake and responds to simple questions  Post-anesthesia pain: pain controlled  Post-anesthesia nausea and vomiting: no  Pulmonary: unassisted, return to baseline  Cardiovascular: stable and blood pressure at baseline  Hydration: adequate  Anesthetic events: no    QCDR Measures:  ASA# 11 - Giulia-op Cardiac Arrest: ASA11B - Patient did NOT experience unanticipated cardiac arrest  ASA# 12 - Giulia-op Mortality Rate: ASA12B - Patient did NOT die  ASA# 13 - PACU Re-Intubation Rate: NA - No ETT / LMA used for case  ASA# 10 - Composite Anes Safety: ASA10A - No serious adverse event    Additional Notes:

## 2021-06-20 NOTE — PROGRESS NOTES
[facility]      Facility:    [facility] [[id number]]    Code Status: DNR   [facility] 8/31 -9/6/18      Chief Complaint/Reason for Visit:  Chief Complaint   Patient presents with     Review Of Multiple Medical Conditions     physical deconditioning, orthostatic hypotension, falls frequently       HPI:   Ninoska is a 73 y.o. female with a history of hypertension, hypothyroid, depression, chronic diarrhea x > 1 year, hyperlipidemia. Ninoska states she has chronic diarrhea, stated it was worked up in the past no cause was found.  Over the last month or more has been having episodes of wooziness and passing out, she had no premonitory symptoms. About a month ago she had fallen and broken her ankle.The day prior to admission she fell multiple times, one of those falls she had a head laceration and a parietal subgaleal hematoma.    She presented to [facility] emergency department where she had positive orthostatic changes in her blood pressure. She was put on IV fluids. Creatinine was up to 1.32, which normalized with IVF. Florinef was added to her regimen to help her BP. CT imaging showed stenoses of the superior mesenteric artery and celiac arteries, which was thought to explain her postprandial symptoms. She was seen by GI, they recommended following through with the other medical problems, if there were persistent postprandial symptoms she could require upper GI and revascularization of the stenotic vessels.     She was also found to have a large uterine mass with left ureteral compression, and marked left hydronephrosis. Per urology she had a temporary stent placed to the left ureter (9/3).  Dr Valdez did a bimanual exam which had friable tumor on the glove which was sent for pathology, which showed a moderately differentiated, invasive squamous cell carcinoma (cervical), but I am going to let the discussion of that be done with [facility] whom her PCP uses as the  consulting group. She spiked a temp to 103 after the stent was placed, she was initially on antibiotics were all cultures were negative, she became rapidly afebrile and she was put then on oral Levaquin.  Urine culture grew Providencia stuartii  which was sensitive to Levaquin. Stool was negative for Shiga toxins and C difficile. Chest x-ray showed laryngeal deviation and possible neck mass, however CT was done and revealed a substernal goiter. She also was started on iron and B12 as both were low, folic acid level was normal.    Today Ninoska is being evaluated for a routine review of multiple medical problems while in the TCU. She was recently admitted to the TCU for PT/OT rehabilitation s/p hospitalization for falls and orthostatic syncope. She was also having several falls and has a broken right ankle. She does have a significant finding of uterine cancer. Ninoska states that she has been doing relatively well and does not have any acute problems to discuss since entering the TCU. She feels that she is slowly improving. She does have several appointments coming up one for urology, one with ortho and one with oncology per her report. She is looking forward to these appointments and feels that she will have a better idea of her care. She has been participating in PT/OT and feels that is going well. She has no other issues to discuss. She denies any other concerns including fevers/chills, cough or cold symptoms, headaches, vision changes, chest pain/pressure, difficulty breathing, SOB, abdominal pain, nausea, vomiting, diarrhea, dysuria, increasing weakness, increasing pain.     Past Medical History:  .  Patient Active Problem List   Diagnosis     Orthostatic syncope     Falls frequently     Diarrhea     Uterine mass     Other hydronephrosis     Abdominal pain     SMA stenosis (H)     Hydronephrosis     Depression     Hypertension     Celiac artery stenosis (H)     Weight loss     Urinary tract infection     Cervical  cancer (H)     Physical deconditioning   ;        Surgical History:  Past Surgical History:   Procedure Laterality Date     CERVICAL BIOPSY N/A 9/3/2018    Procedure: BIOPSY, CERVIX, PUNCH;  Surgeon: Mike Valdez MD;  Location: [facility];  Service:      KS CYSTOSCOPY,INSERT URETERAL STENT Left 9/3/2018    Procedure: CYSTOSCOPY, WITH left retrograde pyelogram, left URETERAL STENT INSERTION ;  Surgeon: Mike Valdez MD;  Location: [facility];  Service: Urology       Family History:   CAD: mom  DM : mom  DM: dad  No known cancers    Social History:    Social History     Social History     Marital status: Single     Spouse name: N/A     Number of children: 3 adult children     Years of education: N/A     Social History Main Topics     Smoking status: Former Smoker     Smokeless tobacco: Never Used     Alcohol use 0.6 oz/week     1 Cans of beer per week     Drug use: No     Sexual activity: Not on file     Review of Systems   Per hpi    Vitals:    09/13/18 1941   BP: 136/63   Pulse: 65   Resp: 18   Temp: 99.5  F (37.5  C)   SpO2: 94%   Weight: 177 lb 3.2 oz (80.4 kg)       Physical Exam   Constitutional: She is oriented to person, place, and time. She appears well-nourished.   Older  female, appears older than her stated age   HENT:   Nose: Nose normal.   Mouth/Throat: Oropharynx is clear and moist.   Mostly edentulous except 2 molars on each side of her mandible   Eyes: Conjunctivae and EOM are normal.   Cardiovascular: Regular rhythm and normal heart sounds.    No murmur heard.  Abdominal: Bowel sounds are normal. She exhibits no distension. There is tenderness.   Mildly tender in the RLQ without rebound --reports it is her stent pain and has been present since stent.  Musculoskeletal: She exhibits edema and deformity.   CAM walker boot on right foot   Neurological: She is alert and oriented to person, place, and time. Grossly normal. Coordination normal.   Skin: Skin is  warm and dry. No rash noted.   Psychiatric: She has a normal mood and affect. Her behavior is normal. Thought content normal.       Medication List:  Current Outpatient Prescriptions   Medication Sig     aspirin 81 MG EC tablet Take 1 tablet (81 mg total) by mouth daily.     atorvastatin (LIPITOR) 40 MG tablet Take 1 tablet (40 mg total) by mouth daily.     cholecalciferol, vitamin D3, 5,000 unit Tab Take by mouth.     colestipol (COLESTID) 1 gram tablet Take 1 tablet (1 g total) by mouth daily.     cyanocobalamin 1,000 mcg/mL injection Inject 1 mL (1,000 mcg total) into the shoulder, thigh, or buttocks every 7 days.     escitalopram oxalate (LEXAPRO) 20 MG tablet Take 20 mg by mouth daily.     ferrous sulfate 325 (65 FE) MG tablet Take 1 tablet (325 mg total) by mouth daily with breakfast.     fludrocortisone (FLORINEF) 0.1 mg tablet Take 1 tablet (0.1 mg total) by mouth daily.     loperamide (IMODIUM) 2 mg capsule Take 1 capsule (2 mg total) by mouth 4 (four) times a day as needed for diarrhea.     ondansetron (ZOFRAN) 8 MG tablet Take 1 tablet (8 mg total) by mouth every 8 (eight) hours as needed.       Labs:  None today.     Assessment / Plan:  1. Orthostatic syncope     2. Physical deconditioning     3. Falls frequently     4. Cervical cancer (H)       Physical Deconditioning due to Orthostatic Syncope/Falls/Cervical Cancer  -Continue PT/OT and other therapies as per care plan.  -Encouraged good nutrition and movement habits.   -Discussed care plan and expected course of stay.   -Continue to follow-up per routine schedule or sooner if needed.     Continue with appointments and work with interprofessional team care.    Otherwise continue current care plan for all other chronic medical conditions, as they are stable. Encouraged patient to engage in healthy lifestyle behaviors such as engaging in social activities, exercising (PT/OT), eating well, and following care plan. Follow up for routine check-up, or sooner  if needed. Will continue to monitor patient and work with nursing staff collaboratively to work toward positive patient outcomes.    Greater than 35 minutes spent with patient and nursing staff with at least 55% of this time spent on review of previous records, counseling, education, and discussion of the above care plan with nursing staff and patient.     Electronically signed by: Donna Luis CNP

## 2021-06-20 NOTE — ANESTHESIA PREPROCEDURE EVALUATION
Anesthesia Evaluation      Patient summary reviewed     Airway   Mallampati: II  Neck ROM: full   Pulmonary     breath sounds clear to auscultation  (-) asthma, shortness of breath, recent URI, sleep apnea                         Cardiovascular   Exercise tolerance: > or = 4 METS  (+) , PVD    (-) angina  ECG reviewed  Rhythm: regular  Rate: normal,         Neuro/Psych    (-) no seizures, no neuromuscular disease, no CVA    Endo/Other    (-) no diabetes     GI/Hepatic/Renal    (+)   chronic renal disease,      Other findings: Anemia  Orthostatic hypotension      Dental    (+) edentulous                       Anesthesia Plan  Planned anesthetic: MAC  Decadron 10mg, zofran     GA LMA prn   ASA 3   Induction: intravenous   Anesthetic plan and risks discussed with: patient  Anesthesia plan special considerations: antiemetics,   Post-op plan: routine recovery

## 2021-06-26 NOTE — PROGRESS NOTES
Progress Notes by Enedina Castro MD at 9/10/2018 11:59 PM     Author: Enedina Castro MD Service: -- Author Type: Physician    Filed: 9/12/2018  4:23 PM Encounter Date: 9/10/2018 Status: Signed    : Enedina Castro MD (Physician)       [facility]      Facility:    [facility] [[id number]]    Code Status: DNR   [facility] 8/31 -9/6/18      Chief Complaint/Reason for Visit:  Chief Complaint   Patient presents with   ? H & P     syncope/falls       HPI:   Ninoska is a 73 y.o. female with a history of hypertension, hypothyroid, depression, chronic diarrhea x > 1 year, hyperlipidemia.  Ninoska states she has chronic diarrhea, stated it was worked up in the past no cause was found.  Over the last month or more has been having episodes of wooziness  And passing out, she had no premonitory symptoms.  About a month ago she had fallen and broken her ankle.The day prior to admission she fell multiple times, one of those falls she had a head laceration and a parietal subgaleal hematoma.    She presented to [facility] emergency department where she had positive orthostatic changes in her blood pressure.  She was put on IV fluids.  Creatinine was up to 1.32, which normalized with IVF. 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  She was seen by GI, they recommended following through with the other medical problems, if there were persistent postprandial symptoms she could require upper GI and revascularization of the stenotic vessels.  She  She was also found to have a large uterine mass with left ureteral compression, and marked left hydronephrosis.  Per urology she had a temporary stent placed to the left ureter (9/3).  Dr Valdez did a bimanual exam which had friable tumor on the glove which was sent for pathology, which  showed a moderately differentiated, invasive squamous cell carcinoma (cervical), but I am going to let the discussion of that be done with   [facility] whom her PCP uses as the consulting group.  She spiked a temp to 103 after the stent was placed, she was initially on antibiotics were all cultures were negative, she became rapidly afebrile and she was put then on oral Levaquin.  Urine culture grew Providencia stuartii  which was sensitive to Levaquin.    Stool was negative for Shiga toxins and C difficile.    Chest x-ray showed laryngeal deviation and possible neck mass, however CT was done and revealed a substernal goiter.    She also was started on iron and B12 as both were low, folic acid level was normal.      Past Medical History:  .  Patient Active Problem List   Diagnosis   ? Orthostatic syncope   ? Falls frequently   ? Diarrhea   ? Uterine mass   ? Other hydronephrosis   ? Abdominal pain   ? SMA stenosis (H)   ? Hydronephrosis   ? Depression   ? Hypertension   ;        Surgical History:  Past Surgical History:   Procedure Laterality Date   ? CERVICAL BIOPSY N/A 9/3/2018    Procedure: BIOPSY, CERVIX, PUNCH;  Surgeon: Mike Valdez MD;  Location: [facility];  Service:    ? [place] CYSTOSCOPY,INSERT URETERAL STENT Left 9/3/2018    Procedure: CYSTOSCOPY, WITH left retrograde pyelogram, left URETERAL STENT INSERTION ;  Surgeon: Mike Valdez MD;  Location: [facility];  Service: Urology       Family History:   CAD: mom  DM : mom  DM: dad  No known cancers    Social History:    Social History     Social History   ? Marital status: Single     Spouse name: N/A   ? Number of children: 3 adult children   ? Years of education: N/A     Social History Main Topics   ? Smoking status: Former Smoker   ? Smokeless tobacco: Never Used   ? Alcohol use 0.6 oz/week     1 Cans of beer per week   ? Drug use: No   ? Sexual activity: Not on file            Review of Systems   She has had a 71# weight  loss in the last year  still gets nausea with any food/fluid intake, Zofran does help some   had bloody urine after the stent was placed but it cleared up   Has a bit of vaginal bleeding  Has no recollection of her last pap smear  Her right foot is not tender with weightbearing line denies headaches or visual changes after her fall.  The remainder of the comprehensive review of systems is negative    Vitals:    09/10/18 0900   BP: 120/67   Pulse: 70   Resp: 20   Temp: 98.4  F (36.9  C)   SpO2: 99%       Physical Exam   Constitutional: She is oriented to person, place, and time. She appears well-nourished.   Older  female, appears older than her stated age   HENT:   Nose: Nose normal.   Mouth/Throat: Oropharynx is clear and moist.   Mostly edentulous except 2 molars on each side of her mandible   Eyes: Conjunctivae and EOM are normal.   Cardiovascular: Regular rhythm and normal heart sounds.    No murmur heard.  Abdominal: Bowel sounds are normal. She exhibits no distension. There is tenderness.   Mildly tender in the epigastrium without rebound   Musculoskeletal: She exhibits edema and deformity.   CAM walker boot on right foot   Lymphadenopathy:     She has no cervical adenopathy.   Neurological: She is alert and oriented to person, place, and time. No cranial nerve deficit. Coordination normal.   Skin: Skin is warm and dry. No rash noted.   Psychiatric: She has a normal mood and affect. Her behavior is normal. Thought content normal.       Medication List:  Current Outpatient Prescriptions   Medication Sig   ? aspirin 81 MG EC tablet Take 1 tablet (81 mg total) by mouth daily.   ? atorvastatin (LIPITOR) 40 MG tablet Take 1 tablet (40 mg total) by mouth daily.   ? cholecalciferol, vitamin D3, 5,000 unit Tab Take by mouth.   ? colestipol (COLESTID) 1 gram tablet Take 1 tablet (1 g total) by mouth daily.   ? cyanocobalamin 1,000 mcg/mL injection Inject 1 mL (1,000 mcg total) into the shoulder, thigh, or  buttocks every 7 days.   ? escitalopram oxalate (LEXAPRO) 20 MG tablet Take 20 mg by mouth daily.   ? ferrous sulfate 325 (65 FE) MG tablet Take 1 tablet (325 mg total) by mouth daily with breakfast.   ? fludrocortisone (FLORINEF) 0.1 mg tablet Take 1 tablet (0.1 mg total) by mouth daily.   ? loperamide (IMODIUM) 2 mg capsule Take 1 capsule (2 mg total) by mouth 4 (four) times a day as needed for diarrhea.   ? ondansetron (ZOFRAN) 8 MG tablet Take 1 tablet (8 mg total) by mouth every 8 (eight) hours as needed.       Labs:  Status:  Final result   Visible to patient:  No (Not Released) Next appt:  None         Ref Range & Units 9/6/18  6:11 AM   9/5/18  7:03 AM      WBC 4.0 - 11.0 thou/uL 6.1 9.4    RBC 3.80 - 5.40 mill/uL 2.53 (L) 2.80 (L)    Hemoglobin 12.0 - 16.0 g/dL 8.7 (L) 9.5 (L)    Hematocrit 35.0 - 47.0 % 26.1 (L) 28.6 (L)    MCV 80 - 100 fL 103 (H) 102 (H)    MCH 27.0 - 34.0 pg 34.4 (H) 33.9    MCHC 32.0 - 36.0 g/dL 33.3 33.2    RDW 11.0 - 14.5 % 13.2 13.1    Platelets 140 - 440 thou/uL 232 243                 Ref Range & Units 9/6/18  6:11 AM   9/5/18  7:03 AM      Sodium 136 - 145 mmol/L 137 136    Potassium 3.5 - 5.0 mmol/L 3.8 3.9    Chloride 98 - 107 mmol/L 107 105    CO2 22 - 31 mmol/L 25 24    Anion Gap, Calculation 5 - 18 mmol/L 5 7    Glucose 70 - 125 mg/dL 90 97    Calcium 8.5 - 10.5 mg/dL 8.0 (L) 8.2 (L)    BUN 8 - 28 mg/dL 11 12    Creatinine 0.60 - 1.10 mg/dL 0.91 0.89    GFR MDRD Non Af Amer >60 mL/min/1.73m2 >60 >60           Final Diagnosis   CERVICAL BIOPSY:     -   INVASIVE SQUAMOUS CELL CARCINOMA, MODERATELY DIFFERENTIATED     -   TUMOR IS PRESENT AT MULTIPLE INKED RESECTION MARGINS         Assessment / Plan:    ICD-10-CM    1. Cervical cancer (H) C53.9 Has appointment with [facility] on 9/18 per pt   2. Uterine mass N85.9    3. Other hydronephrosis: 2/2 pelvic/uterine mass N13.39 Stent should be changed q 3-4 months   4. SMA stenosis (H) I77.1 Complete ostial occlusion   5. Celiac  artery stenosis (H) I77.4    6. Orthostatic syncope I95.1 On fluorinef   7. Diarrhea, unspecified type R19.7 Uses Imodium   8. Falls frequently R29.6    9. Weight loss R63.4    10. Urinary tract infection N39.0            Electronically signed by: Enedina Castro MD

## 2021-06-26 NOTE — PROGRESS NOTES
Progress Notes by Enedina Castro MD at 9/17/2018 11:59 PM     Author: Enedina Castro MD Service: -- Author Type: Physician    Filed: 10/3/2018  1:21 PM Encounter Date: 9/17/2018 Status: Addendum    : Enedina Castro MD (Physician)    Related Notes: Original Note by Enedina Castro MD (Physician) filed at 9/19/2018 10:49 PM       [facility]      Facility:    [facility] [[id number]]    Code Status: DNR   [facility] 8/31 -9/6/18      Chief Complaint/Reason for Visit:  Chief Complaint   Patient presents with   ? Review Of Multiple Medical Conditions      SCC of the cervix       HPI:   Ninoska is a 73 y.o. female with a history of hypertension, hypothyroid, depression, chronic diarrhea x > 1 year, hyperlipidemia.  Ninoska states she has chronic diarrhea, stated it was worked up in the past no cause was found.  Over the last month or more has been having episodes of wooziness  And passing out, she had no premonitory symptoms.  About a month ago she had fallen and broken her ankle.The day prior to admission she fell multiple times, one of those falls she had a head laceration and a parietal subgaleal hematoma.    She presented to [facility] emergency department where she had positive orthostatic changes in her blood pressure.  She was put on IV fluids.  Creatinine was up to 1.32, which normalized with IVF. 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  She was seen by GI, they recommended following through with the other medical problems, if there were persistent postprandial symptoms she could require upper GI and revascularization of the stenotic vessels.  She  She was also found to have a large uterine mass with left ureteral compression, and marked left hydronephrosis.  Per urology she had a temporary stent  placed to the left ureter (9/3).  Dr Valdez did a bimanual exam which had friable tumor on the glove which was sent for pathology, which showed a moderately differentiated, invasive squamous cell carcinoma (cervical), but I am going to let the discussion of that be done with   [facility] whom her PCP uses as the consulting group.  She spiked a temp to 103 after the stent was placed, she was initially on antibiotics were all cultures were negative, she became rapidly afebrile and she was put then on oral Levaquin.  Urine culture grew Providencia stuartii  which was sensitive to Levaquin.    Stool was negative for Shiga toxins and C difficile.    Chest x-ray showed laryngeal deviation and possible neck mass, however CT was done and revealed a substernal goiter.    She also was started on iron and B12 as both were low, folic acid level was normal.      Past Medical History:  .  Patient Active Problem List   Diagnosis   ? Orthostatic syncope   ? Falls frequently   ? Diarrhea   ? Uterine mass   ? Other hydronephrosis   ? Abdominal pain   ? SMA stenosis (H)   ? Hydronephrosis   ? Depression   ? Hypertension   ? Celiac artery stenosis (H)   ? Weight loss   ? Urinary tract infection   ? Cervical cancer (H)   ? Physical deconditioning   ? Vaginal bleeding   ? Malignant neoplasm of overlapping sites of cervix (H)   ? Anemia due to blood loss, acute   ;        Surgical History:  Past Surgical History:   Procedure Laterality Date   ? CERVICAL BIOPSY N/A 9/3/2018    Procedure: BIOPSY, CERVIX, PUNCH;  Surgeon: Mike Valdez MD;  Location: [facility];  Service:    ? [place] CYSTOSCOPY,INSERT URETERAL STENT Left 9/3/2018    Procedure: CYSTOSCOPY, WITH left retrograde pyelogram, left URETERAL STENT INSERTION ;  Surgeon: Mike Valdez MD;  Location: [facility];  Service: Urology       Family History:   CAD: mom  DM : mom  DM: dad  No known cancers    Social History:    Social History     Social  History   ? Marital status: Single     Spouse name: N/A   ? Number of children: 3 adult children   ? Years of education: N/A     Social History Main Topics   ? Smoking status: Former Smoker   ? Smokeless tobacco: Never Used   ? Alcohol use 0.6 oz/week     1 Cans of beer per week   ? Drug use: No   ? Sexual activity: Not on file            Review of Systems   Left lower abdomen achy pain  Eating and sleeping ok  Sees oncology tomorrow, a bit nervous.  Her daughter told her that Ninoska may not be a candidate for therapy, but we discussed that it will be explained tomorrow, she can ask questions    Blood pressure 144/63, pulse 66, temperature 98.3  F (36.8  C), resp. rate 19, SpO2 94 %.        Physical Exam   Constitutional: She is oriented to person, place, and time.   Older  female, appears older than her stated age   HENT:   Mostly edentulous except 2 molars on each side of her mandible   Cardiovascular: Regular rhythm and normal heart sounds.    No murmur heard.  Abdominal: Bowel sounds are normal. She exhibits no distension. There is tenderness.   Mildly tender in the epigastrium without rebound   Musculoskeletal: She exhibits edema and deformity.   CAM walker boot on right foot   Neurological: She is alert and oriented to person, place, and time. No cranial nerve deficit. Coordination normal.   Symmetric movement   Skin: Skin is warm and dry. No rash noted.   Psychiatric: She has a normal mood and affect. Her behavior is normal.       Medication List:  Current Outpatient Prescriptions   Medication Sig   ? acetaminophen (TYLENOL) 325 MG tablet Take 650 mg by mouth every 4 (four) hours as needed for pain.   ? atorvastatin (LIPITOR) 40 MG tablet Take 1 tablet (40 mg total) by mouth daily.   ? cholecalciferol, vitamin D3, 5,000 unit Tab Take 5,000 Units by mouth daily.    ? colestipol (COLESTID) 1 gram tablet Take 1 tablet (1 g total) by mouth daily.   ? escitalopram oxalate (LEXAPRO) 20 MG tablet Take 20 mg  by mouth daily.   ? fludrocortisone (FLORINEF) 0.1 mg tablet Take 1 tablet (0.1 mg total) by mouth daily.   ? loperamide (IMODIUM) 2 mg capsule Take 1 capsule (2 mg total) by mouth 4 (four) times a day as needed for diarrhea.   ? ondansetron (ZOFRAN) 8 MG tablet Take 1 tablet (8 mg total) by mouth every 8 (eight) hours as needed.       Labs:          Ref Range & Units 9/6/18  6:11 AM   9/5/18  7:03 AM      WBC 4.0 - 11.0 thou/uL 6.1 9.4    RBC 3.80 - 5.40 mill/uL 2.53 (L) 2.80 (L)    Hemoglobin 12.0 - 16.0 g/dL 8.7 (L) 9.5 (L)    Hematocrit 35.0 - 47.0 % 26.1 (L) 28.6 (L)    MCV 80 - 100 fL 103 (H) 102 (H)    MCH 27.0 - 34.0 pg 34.4 (H) 33.9    MCHC 32.0 - 36.0 g/dL 33.3 33.2    RDW 11.0 - 14.5 % 13.2 13.1    Platelets 140 - 440 thou/uL 232 243                 Ref Range & Units 9/6/18  6:11 AM   9/5/18  7:03 AM      Sodium 136 - 145 mmol/L 137 136    Potassium 3.5 - 5.0 mmol/L 3.8 3.9    Chloride 98 - 107 mmol/L 107 105    CO2 22 - 31 mmol/L 25 24    Anion Gap, Calculation 5 - 18 mmol/L 5 7    Glucose 70 - 125 mg/dL 90 97    Calcium 8.5 - 10.5 mg/dL 8.0 (L) 8.2 (L)    BUN 8 - 28 mg/dL 11 12    Creatinine 0.60 - 1.10 mg/dL 0.91 0.89    GFR MDRD Non Af Amer >60 mL/min/1.73m2 >60 >60           Final Diagnosis   CERVICAL BIOPSY:     -   INVASIVE SQUAMOUS CELL CARCINOMA, MODERATELY DIFFERENTIATED     -   TUMOR IS PRESENT AT MULTIPLE INKED RESECTION MARGINS       Assessment / Plan:      ICD-10-CM    1. Cervical cancer (H) C53.9 Sees oncologist tomorrow   2. Uterine mass N85.9    3. Hydronephrosis, 2/2 pelvic/uterine mass N13.30 Stented: change q 3-4 months   4. SMA stenosis (H) I77.1    5. Celiac artery stenosis (H) I77.4    6. Diarrhea, unspecified type R19.7 Imodium as needed   7. Weight loss R63.4    8. Orthostatic syncope I95.1 Fluorinef: less symptomatic         Electronically signed by: Enedina Castro MD

## 2021-06-26 NOTE — PROGRESS NOTES
Progress Notes by Enedina Castro MD at 9/27/2018 11:59 PM     Author: Enedina Castro MD Service: -- Author Type: Physician    Filed: 10/3/2018  9:07 PM Encounter Date: 9/27/2018 Status: Signed    : Enedina Castro MD (Physician)       [facility]      Facility:    [facility] [[id number]]    Code Status: DNR   [facility] 8/31 -9/6/18      Chief Complaint/Reason for Visit:  Chief Complaint   Patient presents with   ? Review Of Multiple Medical Conditions     Cervical cancer/ L hydronephrosis       HPI:   Ninoska is a 73 y.o. female with a history of hypertension, hypothyroid, depression, chronic diarrhea x > 1 year, hyperlipidemia.  Ninoska states she has chronic diarrhea, stated it was worked up in the past no cause was found.  Over the last month or more has been having episodes of wooziness  And passing out, she had no premonitory symptoms.  About a month ago she had fallen and broken her ankle.The day prior to admission she fell multiple times, one of those falls she had a head laceration and a parietal subgaleal hematoma.    She presented to [facility] emergency department where she had positive orthostatic changes in her blood pressure.  She was put on IV fluids.  Creatinine was up to 1.32, which normalized with IVF. 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  She was seen by GI, they recommended following through with the other medical problems, if there were persistent postprandial symptoms she could require upper GI and revascularization of the stenotic vessels.  She  She was also found to have a large uterine mass with left ureteral compression, and marked left hydronephrosis.  Per urology she had a temporary stent placed to the left ureter (9/3).  Dr Valdez did a bimanual exam which had friable  tumor on the glove which was sent for pathology, which showed a moderately differentiated, invasive squamous cell carcinoma (cervical), but I am going to let the discussion of that be done with   [facility] whom her PCP uses as the consulting group.  She spiked a temp to 103 after the stent was placed, she was initially on antibiotics were all cultures were negative, she became rapidly afebrile and she was put then on oral Levaquin.  Urine culture grew Providencia stuartii  which was sensitive to Levaquin.    Stool was negative for Shiga toxins and C difficile.    Chest x-ray showed laryngeal deviation and possible neck mass, however CT was done and revealed a substernal goiter.    She also was started on iron and B12 as both were low, folic acid level was normal.    UPDATE:  She had a PET scan done through [facility], daughter got a phone message that there is a spot that lit up in the neck   Negative testing indicated that she needed long-term care, daughter states her mobile home is in disrepair and the family is not able to provide her the assistance that she leaves, daughter cannot take care of her in her own home.  CPT equal 4.2 showing significant cognitive impairment.  Herself does not think she needs to live in a facility, she feels self-sufficient  She still has a left lower abdomen and pelvic pressure pain      Past Medical History:  .  Patient Active Problem List   Diagnosis   ? Orthostatic syncope   ? Falls frequently   ? Diarrhea   ? Uterine mass   ? Other hydronephrosis   ? Abdominal pain   ? SMA stenosis (H)   ? Hydronephrosis   ? Depression   ? Hypertension   ? Celiac artery stenosis (H)   ? Weight loss   ? Urinary tract infection   ? Cervical cancer (H)   ? Physical deconditioning   ? Vaginal bleeding   ? Malignant neoplasm of overlapping sites of cervix (H)   ? Anemia due to blood loss, acute   ;        Surgical History:  Past Surgical History:   Procedure Laterality Date   ? CERVICAL BIOPSY N/A  9/3/2018    Procedure: BIOPSY, CERVIX, PUNCH;  Surgeon: Mike Valdez MD;  Location: [facility];  Service:    ? [place] CYSTOSCOPY,INSERT URETERAL STENT Left 9/3/2018    Procedure: CYSTOSCOPY, WITH left retrograde pyelogram, left URETERAL STENT INSERTION ;  Surgeon: Mike Valdez MD;  Location: [facility];  Service: Urology       ? Sexual activity: Not on file            Review of Systems   As above under update      Blood pressure 115/56, pulse 68, temperature 98.3  F (36.8  C), resp. rate 18, SpO2 95 %.        Physical Exam   Constitutional:. Quiet  female, appears older than her stated age    Cardiovascular: Regular rhythm and normal heart sounds.  No murmur heard.  Abdominal: Bowel sounds are normal.Mildly tender in the LLQ, no rebound   Musculoskeletal: She exhibits edema and deformity.    Neurological: She is alert and oriented to person, place, and time. No cranial nerve deficit. Coordination normal.  Served walking slowly with a walker, steady gait  Skin: Skin is warm and dry. No rash noted.   Psychiatric: She has a normal  affect. Her behavior is normal.       Medication List:  Current Outpatient Prescriptions   Medication Sig   ? acetaminophen (TYLENOL) 325 MG tablet Take 650 mg by mouth every 4 (four) hours as needed for pain.   ? atorvastatin (LIPITOR) 40 MG tablet Take 1 tablet (40 mg total) by mouth daily.   ? cholecalciferol, vitamin D3, 5,000 unit Tab Take 5,000 Units by mouth daily.    ? colestipol (COLESTID) 1 gram tablet Take 1 tablet (1 g total) by mouth daily.   ? escitalopram oxalate (LEXAPRO) 20 MG tablet Take 20 mg by mouth daily.   ? fludrocortisone (FLORINEF) 0.1 mg tablet Take 1 tablet (0.1 mg total) by mouth daily.   ? loperamide (IMODIUM) 2 mg capsule Take 1 capsule (2 mg total) by mouth 4 (four) times a day as needed for diarrhea.   ? ondansetron (ZOFRAN) 8 MG tablet Take 1 tablet (8 mg total) by mouth every 8 (eight) hours as needed.  "      Labs:          Ref Range & Units 9/6/18  6:11 AM   9/5/18  7:03 AM      WBC 4.0 - 11.0 thou/uL 6.1 9.4    RBC 3.80 - 5.40 mill/uL 2.53 (L) 2.80 (L)    Hemoglobin 12.0 - 16.0 g/dL 8.7 (L) 9.5 (L)    Hematocrit 35.0 - 47.0 % 26.1 (L) 28.6 (L)    MCV 80 - 100 fL 103 (H) 102 (H)    MCH 27.0 - 34.0 pg 34.4 (H) 33.9    MCHC 32.0 - 36.0 g/dL 33.3 33.2    RDW 11.0 - 14.5 % 13.2 13.1    Platelets 140 - 440 thou/uL 232 243                 Ref Range & Units 9/6/18  6:11 AM   9/5/18  7:03 AM      Sodium 136 - 145 mmol/L 137 136    Potassium 3.5 - 5.0 mmol/L 3.8 3.9    Chloride 98 - 107 mmol/L 107 105    CO2 22 - 31 mmol/L 25 24    Anion Gap, Calculation 5 - 18 mmol/L 5 7    Glucose 70 - 125 mg/dL 90 97    Calcium 8.5 - 10.5 mg/dL 8.0 (L) 8.2 (L)    BUN 8 - 28 mg/dL 11 12    Creatinine 0.60 - 1.10 mg/dL 0.91 0.89    GFR MDRD Non Af Amer >60 mL/min/1.73m2 >60 >60           Final Diagnosis   CERVICAL BIOPSY:     -   INVASIVE SQUAMOUS CELL CARCINOMA, MODERATELY DIFFERENTIATED     -   TUMOR IS PRESENT AT MULTIPLE INKED RESECTION MARGINS       Assessment / Plan:      ICD-10-CM    1. Cervical cancer (H) C53.9 Await  treatment options to be outlined with hr oncologist   2. Uterine mass N85.9    3. Hydronephrosis, 2/2 pelvic/uterine mass N13.30 Stented: change q 3-4 months   4. SMA stenosis (H) I77.1 Probable cause of  nausea and poor appetite   5. Celiac artery stenosis (H) I77.4                 \"   6. Weight loss R63.4 Cancer and GI arterial insufficiency   7.  Cognitive impairment R41.89 awaiting long-term care placement   8. Orthostatic syncope I95.1 Fluorinef: not symptomatic         Electronically signed by: Enedina Castro MD       "

## 2021-06-26 NOTE — PROGRESS NOTES
Progress Notes by Enedina Castro MD at 9/24/2018 12:07 PM     Author: Enedina Castro MD Service: -- Author Type: Physician    Filed: 9/26/2018  9:35 PM Encounter Date: 9/24/2018 Status: Signed    : Enedina Castro MD (Physician)       [facility]      Facility:    [facility] [[id number]]    Code Status: DNR   [facility] 8/31 -9/6/18      Chief Complaint/Reason for Visit:  Chief Complaint   Patient presents with   ? Review Of Multiple Medical Conditions     SCC of the cervix, with large pelvic mass       HPI:   Ninoska is a 73 y.o. female with a history of hypertension, hypothyroid, depression, chronic diarrhea x > 1 year, hyperlipidemia.  Ninoska states she has chronic diarrhea, stated it was worked up in the past no cause was found.  Over the last month or more has been having episodes of wooziness  And passing out, she had no premonitory symptoms.  About a month ago she had fallen and broken her ankle.The day prior to admission she fell multiple times, one of those falls she had a head laceration and a parietal subgaleal hematoma.    She presented to [facility] emergency department where she had positive orthostatic changes in her blood pressure.  She was put on IV fluids.  Creatinine was up to 1.32, which normalized with IVF. 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  She was seen by GI, they recommended following through with the other medical problems, if there were persistent postprandial symptoms she could require upper GI and revascularization of the stenotic vessels.  She  She was also found to have a large uterine mass with left ureteral compression, and marked left hydronephrosis.  Per urology she had a temporary stent placed to the left ureter (9/3).  Dr Valdez did a bimanual exam which had  friable tumor on the glove which was sent for pathology, which showed a moderately differentiated, invasive squamous cell carcinoma (cervical), but I am going to let the discussion of that be done with   [facility] whom her PCP uses as the consulting group.  She spiked a temp to 103 after the stent was placed, she was initially on antibiotics were all cultures were negative, she became rapidly afebrile and she was put then on oral Levaquin.  Urine culture grew Providencia stuartii  which was sensitive to Levaquin.    Stool was negative for Shiga toxins and C difficile.    Chest x-ray showed laryngeal deviation and possible neck mass, however CT was done and revealed a substernal goiter.    She also was started on iron and B12 as both were low, folic acid level was normal.    UPDATE:  She had a PET scan done through [facility], has an appointment in a few days to over the results.  She is walking some in therapy      Past Medical History:  .  Patient Active Problem List   Diagnosis   ? Orthostatic syncope   ? Falls frequently   ? Diarrhea   ? Uterine mass   ? Other hydronephrosis   ? Abdominal pain   ? SMA stenosis (H)   ? Hydronephrosis   ? Depression   ? Hypertension   ? Celiac artery stenosis (H)   ? Weight loss   ? Urinary tract infection   ? Cervical cancer (H)   ? Physical deconditioning   ;        Surgical History:  Past Surgical History:   Procedure Laterality Date   ? CERVICAL BIOPSY N/A 9/3/2018    Procedure: BIOPSY, CERVIX, PUNCH;  Surgeon: Mike Valdez MD;  Location: [facility];  Service:    ? OH CYSTOSCOPY,INSERT URETERAL STENT Left 9/3/2018    Procedure: CYSTOSCOPY, WITH left retrograde pyelogram, left URETERAL STENT INSERTION ;  Surgeon: Mike Valdez MD;  Location: [facility];  Service: Urology       ? Sexual activity: Not on file            Review of Systems   Left lower abdomen achy pain continues at a low level      Blood pressure 159/63, pulse 67, temperature  98.1  F (36.7  C), resp. rate 20, SpO2 100 %.        Physical Exam      Constitutional:. Older  female, appears older than her stated age    Cardiovascular: Regular rhythm and normal heart sounds.  No murmur heard.  Abdominal: Bowel sounds are normal.Mildly tender in the epigastrium without rebound   Musculoskeletal: She exhibits edema and deformity.   CAM walker boot on right foot   Neurological: She is alert and oriented to person, place, and time. No cranial nerve deficit. Coordination normal.   Skin: Skin is warm and dry. No rash noted.   Psychiatric: She has a normal  affect. Her behavior is normal.       Medication List:  Current Outpatient Prescriptions   Medication Sig   ? aspirin 81 MG EC tablet Take 1 tablet (81 mg total) by mouth daily.   ? atorvastatin (LIPITOR) 40 MG tablet Take 1 tablet (40 mg total) by mouth daily.   ? cholecalciferol, vitamin D3, 5,000 unit Tab Take by mouth.   ? colestipol (COLESTID) 1 gram tablet Take 1 tablet (1 g total) by mouth daily.   ? cyanocobalamin 1,000 mcg/mL injection Inject 1 mL (1,000 mcg total) into the shoulder, thigh, or buttocks every 7 days.   ? escitalopram oxalate (LEXAPRO) 20 MG tablet Take 20 mg by mouth daily.   ? ferrous sulfate 325 (65 FE) MG tablet Take 1 tablet (325 mg total) by mouth daily with breakfast.   ? fludrocortisone (FLORINEF) 0.1 mg tablet Take 1 tablet (0.1 mg total) by mouth daily.   ? loperamide (IMODIUM) 2 mg capsule Take 1 capsule (2 mg total) by mouth 4 (four) times a day as needed for diarrhea.   ? ondansetron (ZOFRAN) 8 MG tablet Take 1 tablet (8 mg total) by mouth every 8 (eight) hours as needed.       Labs:          Ref Range & Units 9/6/18  6:11 AM   9/5/18  7:03 AM      WBC 4.0 - 11.0 thou/uL 6.1 9.4    RBC 3.80 - 5.40 mill/uL 2.53 (L) 2.80 (L)    Hemoglobin 12.0 - 16.0 g/dL 8.7 (L) 9.5 (L)    Hematocrit 35.0 - 47.0 % 26.1 (L) 28.6 (L)    MCV 80 - 100 fL 103 (H) 102 (H)    MCH 27.0 - 34.0 pg 34.4 (H) 33.9    MCHC 32.0 -  36.0 g/dL 33.3 33.2    RDW 11.0 - 14.5 % 13.2 13.1    Platelets 140 - 440 thou/uL 232 243                 Ref Range & Units 9/6/18  6:11 AM   9/5/18  7:03 AM      Sodium 136 - 145 mmol/L 137 136    Potassium 3.5 - 5.0 mmol/L 3.8 3.9    Chloride 98 - 107 mmol/L 107 105    CO2 22 - 31 mmol/L 25 24    Anion Gap, Calculation 5 - 18 mmol/L 5 7    Glucose 70 - 125 mg/dL 90 97    Calcium 8.5 - 10.5 mg/dL 8.0 (L) 8.2 (L)    BUN 8 - 28 mg/dL 11 12    Creatinine 0.60 - 1.10 mg/dL 0.91 0.89    GFR MDRD Non Af Amer >60 mL/min/1.73m2 >60 >60           Final Diagnosis   CERVICAL BIOPSY:     -   INVASIVE SQUAMOUS CELL CARCINOMA, MODERATELY DIFFERENTIATED     -   TUMOR IS PRESENT AT MULTIPLE INKED RESECTION MARGINS       Assessment / Plan:      ICD-10-CM    1. Cervical cancer (H) C53.9 Await PET scan review and treatment options to be outlined with hr oncologist   2. Uterine mass N85.9    3. Hydronephrosis, 2/2 pelvic/uterine mass N13.30 Stented: change q 3-4 months   4. SMA stenosis (H) I77.1    5. Celiac artery stenosis (H) I77.4    6. Weight loss R63.4    7. Orthostatic syncope I95.1 Fluorinef: less symptomatic         Electronically signed by: Enedina Castro MD